# Patient Record
Sex: FEMALE | Race: WHITE | NOT HISPANIC OR LATINO
[De-identification: names, ages, dates, MRNs, and addresses within clinical notes are randomized per-mention and may not be internally consistent; named-entity substitution may affect disease eponyms.]

---

## 2022-06-22 ENCOUNTER — RESULT REVIEW (OUTPATIENT)
Age: 32
End: 2022-06-22

## 2022-08-09 PROBLEM — Z00.00 ENCOUNTER FOR PREVENTIVE HEALTH EXAMINATION: Status: ACTIVE | Noted: 2022-08-09

## 2022-09-01 ENCOUNTER — ASOB RESULT (OUTPATIENT)
Age: 32
End: 2022-09-01

## 2022-09-01 ENCOUNTER — APPOINTMENT (OUTPATIENT)
Dept: ANTEPARTUM | Facility: CLINIC | Age: 32
End: 2022-09-01

## 2022-09-01 PROCEDURE — 76811 OB US DETAILED SNGL FETUS: CPT

## 2023-01-19 ENCOUNTER — TRANSCRIPTION ENCOUNTER (OUTPATIENT)
Age: 33
End: 2023-01-19

## 2023-01-20 ENCOUNTER — TRANSCRIPTION ENCOUNTER (OUTPATIENT)
Age: 33
End: 2023-01-20

## 2023-01-20 ENCOUNTER — INPATIENT (INPATIENT)
Facility: HOSPITAL | Age: 33
LOS: 2 days | Discharge: HOME | End: 2023-01-23
Attending: OBSTETRICS & GYNECOLOGY | Admitting: OBSTETRICS & GYNECOLOGY
Payer: COMMERCIAL

## 2023-01-20 VITALS
RESPIRATION RATE: 18 BRPM | HEART RATE: 77 BPM | SYSTOLIC BLOOD PRESSURE: 118 MMHG | TEMPERATURE: 99 F | OXYGEN SATURATION: 98 % | DIASTOLIC BLOOD PRESSURE: 67 MMHG

## 2023-01-20 DIAGNOSIS — O26.899 OTHER SPECIFIED PREGNANCY RELATED CONDITIONS, UNSPECIFIED TRIMESTER: ICD-10-CM

## 2023-01-20 DIAGNOSIS — Z34.80 ENCOUNTER FOR SUPERVISION OF OTHER NORMAL PREGNANCY, UNSPECIFIED TRIMESTER: ICD-10-CM

## 2023-01-20 LAB
BASOPHILS # BLD AUTO: 0.06 K/UL — SIGNIFICANT CHANGE UP (ref 0–0.2)
BASOPHILS NFR BLD AUTO: 0.5 % — SIGNIFICANT CHANGE UP (ref 0–2)
BLD GP AB SCN SERPL QL: NEGATIVE — SIGNIFICANT CHANGE UP
COVID-19 SPIKE DOMAIN AB INTERP: POSITIVE
COVID-19 SPIKE DOMAIN ANTIBODY RESULT: >250 U/ML — HIGH
EOSINOPHIL # BLD AUTO: 0.09 K/UL — SIGNIFICANT CHANGE UP (ref 0–0.5)
EOSINOPHIL NFR BLD AUTO: 0.7 % — SIGNIFICANT CHANGE UP (ref 0–6)
HCT VFR BLD CALC: 35.1 % — SIGNIFICANT CHANGE UP (ref 34.5–45)
HGB BLD-MCNC: 11.8 G/DL — SIGNIFICANT CHANGE UP (ref 11.5–15.5)
IMM GRANULOCYTES NFR BLD AUTO: 1.1 % — HIGH (ref 0–0.9)
LYMPHOCYTES # BLD AUTO: 18.7 % — SIGNIFICANT CHANGE UP (ref 13–44)
LYMPHOCYTES # BLD AUTO: 2.32 K/UL — SIGNIFICANT CHANGE UP (ref 1–3.3)
MCHC RBC-ENTMCNC: 33.6 GM/DL — SIGNIFICANT CHANGE UP (ref 32–36)
MCHC RBC-ENTMCNC: 33.8 PG — SIGNIFICANT CHANGE UP (ref 27–34)
MCV RBC AUTO: 100.6 FL — HIGH (ref 80–100)
MONOCYTES # BLD AUTO: 0.65 K/UL — SIGNIFICANT CHANGE UP (ref 0–0.9)
MONOCYTES NFR BLD AUTO: 5.2 % — SIGNIFICANT CHANGE UP (ref 2–14)
NEUTROPHILS # BLD AUTO: 9.17 K/UL — HIGH (ref 1.8–7.4)
NEUTROPHILS NFR BLD AUTO: 73.8 % — SIGNIFICANT CHANGE UP (ref 43–77)
NRBC # BLD: 0 /100 WBCS — SIGNIFICANT CHANGE UP (ref 0–0)
PLATELET # BLD AUTO: 280 K/UL — SIGNIFICANT CHANGE UP (ref 150–400)
RBC # BLD: 3.49 M/UL — LOW (ref 3.8–5.2)
RBC # FLD: 12 % — SIGNIFICANT CHANGE UP (ref 10.3–14.5)
RH IG SCN BLD-IMP: POSITIVE — SIGNIFICANT CHANGE UP
RH IG SCN BLD-IMP: POSITIVE — SIGNIFICANT CHANGE UP
SARS-COV-2 IGG+IGM SERPL QL IA: >250 U/ML — HIGH
SARS-COV-2 IGG+IGM SERPL QL IA: POSITIVE
SARS-COV-2 RNA SPEC QL NAA+PROBE: DETECTED
T PALLIDUM AB TITR SER: NEGATIVE — SIGNIFICANT CHANGE UP
WBC # BLD: 12.43 K/UL — HIGH (ref 3.8–10.5)
WBC # FLD AUTO: 12.43 K/UL — HIGH (ref 3.8–10.5)

## 2023-01-20 PROCEDURE — 88307 TISSUE EXAM BY PATHOLOGIST: CPT | Mod: 26

## 2023-01-20 DEVICE — ARISTA 3GR: Type: IMPLANTABLE DEVICE | Status: FUNCTIONAL

## 2023-01-20 RX ORDER — OXYTOCIN 10 UNIT/ML
333.33 VIAL (ML) INJECTION
Qty: 20 | Refills: 0 | Status: DISCONTINUED | OUTPATIENT
Start: 2023-01-20 | End: 2023-01-23

## 2023-01-20 RX ORDER — OXYCODONE HYDROCHLORIDE 5 MG/1
5 TABLET ORAL
Refills: 0 | Status: DISCONTINUED | OUTPATIENT
Start: 2023-01-20 | End: 2023-01-21

## 2023-01-20 RX ORDER — SODIUM CHLORIDE 9 MG/ML
1000 INJECTION, SOLUTION INTRAVENOUS
Refills: 0 | Status: DISCONTINUED | OUTPATIENT
Start: 2023-01-20 | End: 2023-01-21

## 2023-01-20 RX ORDER — CITRIC ACID/SODIUM CITRATE 300-500 MG
15 SOLUTION, ORAL ORAL EVERY 6 HOURS
Refills: 0 | Status: DISCONTINUED | OUTPATIENT
Start: 2023-01-20 | End: 2023-01-21

## 2023-01-20 RX ORDER — LANOLIN
1 OINTMENT (GRAM) TOPICAL EVERY 6 HOURS
Refills: 0 | Status: DISCONTINUED | OUTPATIENT
Start: 2023-01-20 | End: 2023-01-23

## 2023-01-20 RX ORDER — ONDANSETRON 8 MG/1
4 TABLET, FILM COATED ORAL EVERY 6 HOURS
Refills: 0 | Status: DISCONTINUED | OUTPATIENT
Start: 2023-01-20 | End: 2023-01-23

## 2023-01-20 RX ORDER — NALBUPHINE HYDROCHLORIDE 10 MG/ML
2.5 INJECTION, SOLUTION INTRAMUSCULAR; INTRAVENOUS; SUBCUTANEOUS EVERY 6 HOURS
Refills: 0 | Status: DISCONTINUED | OUTPATIENT
Start: 2023-01-20 | End: 2023-01-23

## 2023-01-20 RX ORDER — KETOROLAC TROMETHAMINE 30 MG/ML
30 SYRINGE (ML) INJECTION EVERY 6 HOURS
Refills: 0 | Status: DISCONTINUED | OUTPATIENT
Start: 2023-01-20 | End: 2023-01-22

## 2023-01-20 RX ORDER — OXYCODONE HYDROCHLORIDE 5 MG/1
5 TABLET ORAL
Refills: 0 | Status: DISCONTINUED | OUTPATIENT
Start: 2023-01-20 | End: 2023-01-23

## 2023-01-20 RX ORDER — NALOXONE HYDROCHLORIDE 4 MG/.1ML
0.1 SPRAY NASAL
Refills: 0 | Status: DISCONTINUED | OUTPATIENT
Start: 2023-01-20 | End: 2023-01-23

## 2023-01-20 RX ORDER — SIMETHICONE 80 MG/1
80 TABLET, CHEWABLE ORAL EVERY 4 HOURS
Refills: 0 | Status: DISCONTINUED | OUTPATIENT
Start: 2023-01-20 | End: 2023-01-23

## 2023-01-20 RX ORDER — SODIUM CHLORIDE 9 MG/ML
1000 INJECTION, SOLUTION INTRAVENOUS
Refills: 0 | Status: DISCONTINUED | OUTPATIENT
Start: 2023-01-20 | End: 2023-01-23

## 2023-01-20 RX ORDER — MORPHINE SULFATE 50 MG/1
2 CAPSULE, EXTENDED RELEASE ORAL ONCE
Refills: 0 | Status: DISCONTINUED | OUTPATIENT
Start: 2023-01-20 | End: 2023-01-22

## 2023-01-20 RX ORDER — OXYCODONE HYDROCHLORIDE 5 MG/1
5 TABLET ORAL ONCE
Refills: 0 | Status: DISCONTINUED | OUTPATIENT
Start: 2023-01-20 | End: 2023-01-23

## 2023-01-20 RX ORDER — MAGNESIUM HYDROXIDE 400 MG/1
30 TABLET, CHEWABLE ORAL
Refills: 0 | Status: DISCONTINUED | OUTPATIENT
Start: 2023-01-20 | End: 2023-01-23

## 2023-01-20 RX ORDER — TETANUS TOXOID, REDUCED DIPHTHERIA TOXOID AND ACELLULAR PERTUSSIS VACCINE, ADSORBED 5; 2.5; 8; 8; 2.5 [IU]/.5ML; [IU]/.5ML; UG/.5ML; UG/.5ML; UG/.5ML
0.5 SUSPENSION INTRAMUSCULAR ONCE
Refills: 0 | Status: DISCONTINUED | OUTPATIENT
Start: 2023-01-20 | End: 2023-01-23

## 2023-01-20 RX ORDER — HEPARIN SODIUM 5000 [USP'U]/ML
5000 INJECTION INTRAVENOUS; SUBCUTANEOUS EVERY 12 HOURS
Refills: 0 | Status: DISCONTINUED | OUTPATIENT
Start: 2023-01-20 | End: 2023-01-23

## 2023-01-20 RX ORDER — CHLORHEXIDINE GLUCONATE 213 G/1000ML
1 SOLUTION TOPICAL ONCE
Refills: 0 | Status: DISCONTINUED | OUTPATIENT
Start: 2023-01-20 | End: 2023-01-21

## 2023-01-20 RX ORDER — ACETAMINOPHEN 500 MG
975 TABLET ORAL
Refills: 0 | Status: DISCONTINUED | OUTPATIENT
Start: 2023-01-20 | End: 2023-01-23

## 2023-01-20 RX ORDER — DEXAMETHASONE 0.5 MG/5ML
4 ELIXIR ORAL EVERY 6 HOURS
Refills: 0 | Status: DISCONTINUED | OUTPATIENT
Start: 2023-01-20 | End: 2023-01-23

## 2023-01-20 RX ORDER — SODIUM CHLORIDE 9 MG/ML
1000 INJECTION, SOLUTION INTRAVENOUS ONCE
Refills: 0 | Status: COMPLETED | OUTPATIENT
Start: 2023-01-20 | End: 2023-01-20

## 2023-01-20 RX ORDER — IBUPROFEN 200 MG
600 TABLET ORAL EVERY 6 HOURS
Refills: 0 | Status: COMPLETED | OUTPATIENT
Start: 2023-01-20 | End: 2023-12-19

## 2023-01-20 RX ORDER — DIPHENHYDRAMINE HCL 50 MG
25 CAPSULE ORAL EVERY 6 HOURS
Refills: 0 | Status: DISCONTINUED | OUTPATIENT
Start: 2023-01-20 | End: 2023-01-23

## 2023-01-20 RX ADMIN — SODIUM CHLORIDE 125 MILLILITER(S): 9 INJECTION, SOLUTION INTRAVENOUS at 12:09

## 2023-01-20 RX ADMIN — Medication 15 MILLILITER(S): at 16:22

## 2023-01-20 RX ADMIN — Medication 0.25 MILLIGRAM(S): at 17:01

## 2023-01-20 RX ADMIN — Medication 0.25 MILLIGRAM(S): at 16:59

## 2023-01-20 RX ADMIN — SODIUM CHLORIDE 1000 MILLILITER(S): 9 INJECTION, SOLUTION INTRAVENOUS at 19:00

## 2023-01-20 NOTE — OB PROVIDER LABOR PROGRESS NOTE - NS_OBIHIFHRDETAILS_OBGYN_ALL_OB_FT
140/ mod sly/ (+) accel/ (+) spon decel x 1 while on back while placing cook balloon
 with minimal variability for about 40 minutes followed by a 3 minute deceleration  with recovery to 160

## 2023-01-20 NOTE — OB PROVIDER TRIAGE NOTE - NSOBPROVIDERNOTE_OBGYN_ALL_OB_FT
A/P: 32y   @ 40 1/7wks presents from MDs office w/ BPP 2/10 (2pts for fluid).  NST reactive  BPP to be done by sono tech  D/W Dr. Yoon

## 2023-01-20 NOTE — OB PROVIDER H&P - NS ATTEND AMEND GEN_ALL_CORE FT
PT SENT FROM OFFICE WHERE ROUTINE BPP FOR POSTDATES=2/8 [MAINOR=7]. PT ADMITS TO FM. HAVING BHC. BLOODY SHOW.   ON L&D NST REACTIVE, NEG CST. BPP 6/10 NO FB, FT DIFFICULT TO ASSESS.   IN VIEW OF 40W1D REC IOL. I BELIEVE PT IS IN EARLY LABOR. IOL PROCESS DISCUSSED. PT AGREES. WILL START BC, THEN CF    KEVEN PATEL

## 2023-01-20 NOTE — OB RN TRIAGE NOTE - NS_MEANSOFARRIVAL_OBGYN_ALL_OB
Online Visit    Date/Time: 1/11/2018 1:56:04 PM  To: NOVA GARCIA  From: MARK GUZMAN  Subject:    Your labs are normal. Have a great day!  Dr. Guzman      Verified Results  Prenatal Panel H (SYPIGG, CBCNO, DIFA, RUBEL, HBAG, ABRHSN, HIVSCR) [id number] 04:10PM MARK GUZMAN     Test Name Result Flag Reference   Hepatitis B Surface Ag NEGATIVE  NEGATIVE   RUBELLA ANTIBODY, .0 UNITS/ML  >9.9   <5.0 Units/mL = Negative for IgG antibodies (Non Immune)  5.0 to 9.9 Units/mL = Equivocal (Non Immune)  >9.9 Units/mL = Immune     Result does not represent an antibody titer.   WHITE BLOOD COUNT 8.5 K/mcL  4.2-11.0   RED CELL COUNT 4.27 mil/mcL  4.00-5.20   HEMOGLOBIN 13.9 g/dl  12.0-15.5   HEMATOCRIT 40.4 %  36.0-46.5   MEAN CORPUSCULAR VOLUME 94.6 fL  78.0-100.0   MEAN CORPUSCULAR HEMOGLOBIN 32.6 pg  26.0-34.0   MEAN CORPUSCULAR HGB CONC 34.4 g/dl  32.0-36.5   RDW-CV 12.6 %  11.0-15.0   PLATELET COUNT 195 K/mcL  140-450   VIRGIL% 69 %     LYM% 19 %     MON% 10 %     EOS% 2 %     BASO% 0 %     VIRGIL ABS 5.8 K/mcL  1.8-7.7   LYM ABS 1.6 K/mcL  1.0-4.8   MON ABS 0.9 K/mcL  0.3-0.9   EOS ABS 0.2 K/mcL  0.1-0.5   BASO ABS 0.0 K/mcL  0.0-0.3   TREPONEMA PALLIDUM IGG AB NONREACTIVE  NONREACTIVE   See Directory of Services for interpretation of syphilis testing algorithm.   DIFF TYPE      AUTOMATED DIFFERENTIAL   ABO/RH(D) A NEGATIVE     ANTIBODY SCREEN NEGATIVE     HIV ANTIGEN/ANTIBODY SCREEN NONREACTIVE  NONREACTIVE       
Ambulatory

## 2023-01-20 NOTE — OB PROVIDER LABOR PROGRESS NOTE - ASSESSMENT
32y   @ 40 1/7wks for IOL 2/2 BPP on L&D 6/10 (no breathing, no movement)  C/W PO cytotec for IOL  Dr. Yoon at bedside  
A/P: Category 2 tracing remote from delivery with a labor induction for a 6/10 BPP(sent from office with  BPP)  -Labor induction started with cervical carrasco and po cytotec  -Pt had 2 doses po cytotec and has decels that have already required 2 doses of terbutaline  I reviewed that our options are to perform AROM and place IUPC and see if we can start pitocin in 30 minutes however in light of the induction indication  and the spontaneous prolonged decels, the fetus will not likely tolerate pitocin and we are remote from delivery. We reviewed currently this is a category 2 tracing  but as we are remote from delivery and have had many decels today and a 6/10 BPP I recommend  delivery. Pt and partner are agreeable.   -Anesthesia notified  Camilo Mace MD

## 2023-01-20 NOTE — OB RN PATIENT PROFILE - FALL HARM RISK - HARM RISK INTERVENTIONS

## 2023-01-20 NOTE — OB PROVIDER H&P - HISTORY OF PRESENT ILLNESS
32y   @ 40 1/7wks for IOL 2/2 BPP on L&D 6/10 (no breathing, no tone). Pt originally presented from MDs office w/ BPP 2/10 (2pts for fluid). (+) fetal movement (+) cramping. Denies CTX, leakage of fluid or vaginal bleeding. PNC uncomplicated.    EFW 3850  GBS Neg  Ax: PCN- rash as child  MedHx: denies  Meds: PNV, Fe  ObHx: PNC uncomplicated to date  GynHx: (+) small fibroid. denies abnl paps/cysts/endometriosis/ HPV  SurgHx: denies  FamHx: Denies  Pt accepts blood products   32y   @ 40 1/7wks for IOL 2/2 BPP on L&D 6/10 (no breathing, no tone). Pt originally presented from MDs office w/ BPP  (2pts for fluid). (+) fetal movement (+) cramping. Denies CTX, leakage of fluid or vaginal bleeding. PNC uncomplicated.    EFW 3850, 6-12 on 22.  GBS Neg  Ax: PCN- rash as child  MedHx: denies  Meds: PNV, Fe  ObHx: PNC uncomplicated to date  GynHx: (+) small fibroid. denies abnl paps/cysts/endometriosis/ HPV  SurgHx: denies  FamHx: Denies  Pt accepts blood products

## 2023-01-20 NOTE — OB PROVIDER TRIAGE NOTE - NSHPPHYSICALEXAM_GEN_ALL_CORE
PE:  T(C): 37 (01-20-23 @ 10:09), Max: 37.0 (01-20-23 @ 10:09)  HR: 76 (01-20-23 @ 10:52) (76 - 91)  BP: 118/67 (01-20-23 @ 10:09) (118/67 - 118/67)  RR: 18 (01-20-23 @ 10:09) (18 - 18)  SpO2: 97% (01-20-23 @ 10:52) (97% - 98%)  CV: RRR  Lungs: CTA B/L  Abd: soft/NT, gravid  EFM: 135/ mod sly/ (+) accel/ (-) decel  [place]: irreg ctx  VE: 0.5/50/-3  BSUS BPP to be done by sono tech

## 2023-01-20 NOTE — OB PROVIDER TRIAGE NOTE - HISTORY OF PRESENT ILLNESS
32y   @ 40 1/7wks presents from MDs office w/ BPP 2/10. (+) fetal movement (+) cramping. Denies CTX, leakage of fluid or vaginal bleeding. PNC uncomplicated.    EFW 3500  GBS Neg  Ax: PCN- rash as child  MedHx: denies  Meds: PNV, Fe  ObHx: PNC uncomplicated to date  GynHx: (+) small fibroid. denies abnl paps/cysts/endometriosis/ HPV  SurgHx: denies  FamHx: Denies  Pt accepts blood products   32y   @ 40 1/7wks presents from MDs office w/ BPP 2/10 (2pts for fluid). (+) fetal movement (+) cramping. Denies CTX, leakage of fluid or vaginal bleeding. PNC uncomplicated.    EFW 3850  GBS Neg  Ax: PCN- rash as child  MedHx: denies  Meds: PNV, Fe  ObHx: PNC uncomplicated to date  GynHx: (+) small fibroid. denies abnl paps/cysts/endometriosis/ HPV  SurgHx: denies  FamHx: Denies  Pt accepts blood products

## 2023-01-20 NOTE — OB RN INTRAOPERATIVE NOTE - NSSELHIDDEN_OBGYN_ALL_OB_FT
[NS_DeliveryAttending1_OBGYN_ALL_OB_FT:[id number]],[NS_DeliveryAssist1_OBGYN_ALL_OB_FT:[id number]=],[NS_DeliveryRN_OBGYN_ALL_OB_FT:[id number]]

## 2023-01-20 NOTE — OB PROVIDER H&P - ASSESSMENT
32y   @ 40 1/7wks for IOL 2/2 BPP on L&D 6/10 (no breathing, no tone)  Admit to L&D  EFM/[place]  Routine labs  IVFluids  NPO/Biictra  Buccal cyto for IOL  Anesthesia c/s  D/W Dr. Yoon 32y   @ 40 1/7wks for IOL 2/2 BPP on L&D 6/10 (no breathing, no movement)  Admit to L&D  EFM/[place]  Routine labs  IVFluids  NPO/Biictra  Buccal cyto for IOL  Anesthesia c/s  D/W Dr. Yoon

## 2023-01-20 NOTE — OB RN DELIVERY SUMMARY - NSSELHIDDEN_OBGYN_ALL_OB_FT
[NS_DeliveryAttending1_OBGYN_ALL_OB_FT:[id number]=],[NS_DeliveryAssist1_OBGYN_ALL_OB_FT:[email or web address]=],[NS_DeliveryRN_OBGYN_ALL_OB_FT:[id number]]

## 2023-01-20 NOTE — OB PROVIDER LABOR PROGRESS NOTE - NS_SUBJECTIVE/OBJECTIVE_OBGYN_ALL_OB_FT
Cook balloon placed w/o incident, 60cc each balloon
Pt seen for another decel of 3 minutes. Pt comfortable

## 2023-01-20 NOTE — OB PROVIDER H&P - NSHPPHYSICALEXAM_GEN_ALL_CORE
Vital Signs Last 24 Hrs  T(C): 37 (20 Jan 2023 10:09), Max: 37.0 (20 Jan 2023 10:09)  T(F): 98.6 (20 Jan 2023 10:09), Max: 98.6 (20 Jan 2023 10:09)  HR: 74 (20 Jan 2023 11:25) (73 - 91)  BP: 118/67 (20 Jan 2023 10:09) (118/67 - 118/67)  BP(mean): --  RR: 18 (20 Jan 2023 10:09) (18 - 18)  SpO2: 98% (20 Jan 2023 11:25) (96% - 98%)    Parameters below as of 20 Jan 2023 10:09  Patient On (Oxygen Delivery Method): room air    Lungs: CTA B/L  Abd: soft/NT, gravid  EFM: 135/ mod sly/ (+) accel/ (-) decel  [place]: irreg ctx  VE: 0.5/60/-3  BPP by sono tech vtx, 6/10

## 2023-01-20 NOTE — OB RN TRIAGE NOTE - FALL HARM RISK - HARM RISK INTERVENTIONS

## 2023-01-20 NOTE — OB RN DELIVERY SUMMARY - NS_SEPSISRSKCALC_OBGYN_ALL_OB_FT
EOS calculated successfully. EOS Risk Factor: 0.5/1000 live births ([facility] national incidence); GA=40w1d; Temp=98.6; ROM=0.017; GBS='Negative'; Antibiotics='No antibiotics or any antibiotics < 2 hrs prior to birth'

## 2023-01-21 LAB
ALBUMIN SERPL ELPH-MCNC: 2.7 G/DL — LOW (ref 3.3–5)
ALP SERPL-CCNC: 93 U/L — SIGNIFICANT CHANGE UP (ref 40–120)
ALT FLD-CCNC: 7 U/L — LOW (ref 10–45)
ANION GAP SERPL CALC-SCNC: 10 MMOL/L — SIGNIFICANT CHANGE UP (ref 5–17)
APTT BLD: 25.3 SEC — LOW (ref 27.5–35.5)
AST SERPL-CCNC: 15 U/L — SIGNIFICANT CHANGE UP (ref 10–40)
BASOPHILS # BLD AUTO: 0.03 K/UL — SIGNIFICANT CHANGE UP (ref 0–0.2)
BASOPHILS # BLD AUTO: 0.03 K/UL — SIGNIFICANT CHANGE UP (ref 0–0.2)
BASOPHILS NFR BLD AUTO: 0.2 % — SIGNIFICANT CHANGE UP (ref 0–2)
BASOPHILS NFR BLD AUTO: 0.2 % — SIGNIFICANT CHANGE UP (ref 0–2)
BILIRUB SERPL-MCNC: 0.2 MG/DL — SIGNIFICANT CHANGE UP (ref 0.2–1.2)
BUN SERPL-MCNC: 6 MG/DL — LOW (ref 7–23)
CALCIUM SERPL-MCNC: 8.5 MG/DL — SIGNIFICANT CHANGE UP (ref 8.4–10.5)
CHLORIDE SERPL-SCNC: 104 MMOL/L — SIGNIFICANT CHANGE UP (ref 96–108)
CO2 SERPL-SCNC: 22 MMOL/L — SIGNIFICANT CHANGE UP (ref 22–31)
CREAT SERPL-MCNC: 0.48 MG/DL — LOW (ref 0.5–1.3)
EGFR: 130 ML/MIN/1.73M2 — SIGNIFICANT CHANGE UP
EOSINOPHIL # BLD AUTO: 0.02 K/UL — SIGNIFICANT CHANGE UP (ref 0–0.5)
EOSINOPHIL # BLD AUTO: 0.1 K/UL — SIGNIFICANT CHANGE UP (ref 0–0.5)
EOSINOPHIL NFR BLD AUTO: 0.1 % — SIGNIFICANT CHANGE UP (ref 0–6)
EOSINOPHIL NFR BLD AUTO: 0.6 % — SIGNIFICANT CHANGE UP (ref 0–6)
FIBRINOGEN PPP-MCNC: 355 MG/DL — SIGNIFICANT CHANGE UP (ref 200–445)
GLUCOSE SERPL-MCNC: 92 MG/DL — SIGNIFICANT CHANGE UP (ref 70–99)
HCT VFR BLD CALC: 19.8 % — CRITICAL LOW (ref 34.5–45)
HCT VFR BLD CALC: 23.7 % — LOW (ref 34.5–45)
HCT VFR BLD CALC: 23.8 % — LOW (ref 34.5–45)
HCT VFR BLD CALC: 24.6 % — LOW (ref 34.5–45)
HGB BLD-MCNC: 6.6 G/DL — CRITICAL LOW (ref 11.5–15.5)
HGB BLD-MCNC: 7.8 G/DL — LOW (ref 11.5–15.5)
HGB BLD-MCNC: 7.8 G/DL — LOW (ref 11.5–15.5)
HGB BLD-MCNC: 8 G/DL — LOW (ref 11.5–15.5)
IMM GRANULOCYTES NFR BLD AUTO: 0.7 % — SIGNIFICANT CHANGE UP (ref 0–0.9)
IMM GRANULOCYTES NFR BLD AUTO: 0.8 % — SIGNIFICANT CHANGE UP (ref 0–0.9)
INR BLD: 1 RATIO — SIGNIFICANT CHANGE UP (ref 0.88–1.16)
LYMPHOCYTES # BLD AUTO: 1.66 K/UL — SIGNIFICANT CHANGE UP (ref 1–3.3)
LYMPHOCYTES # BLD AUTO: 10.6 % — LOW (ref 13–44)
LYMPHOCYTES # BLD AUTO: 12.9 % — LOW (ref 13–44)
LYMPHOCYTES # BLD AUTO: 2.19 K/UL — SIGNIFICANT CHANGE UP (ref 1–3.3)
MCHC RBC-ENTMCNC: 32.5 GM/DL — SIGNIFICANT CHANGE UP (ref 32–36)
MCHC RBC-ENTMCNC: 32.5 PG — SIGNIFICANT CHANGE UP (ref 27–34)
MCHC RBC-ENTMCNC: 32.6 PG — SIGNIFICANT CHANGE UP (ref 27–34)
MCHC RBC-ENTMCNC: 32.8 GM/DL — SIGNIFICANT CHANGE UP (ref 32–36)
MCHC RBC-ENTMCNC: 32.9 GM/DL — SIGNIFICANT CHANGE UP (ref 32–36)
MCHC RBC-ENTMCNC: 33.3 GM/DL — SIGNIFICANT CHANGE UP (ref 32–36)
MCHC RBC-ENTMCNC: 33.5 PG — SIGNIFICANT CHANGE UP (ref 27–34)
MCHC RBC-ENTMCNC: 33.8 PG — SIGNIFICANT CHANGE UP (ref 27–34)
MCV RBC AUTO: 101.5 FL — HIGH (ref 80–100)
MCV RBC AUTO: 102.9 FL — HIGH (ref 80–100)
MCV RBC AUTO: 98.8 FL — SIGNIFICANT CHANGE UP (ref 80–100)
MCV RBC AUTO: 99.6 FL — SIGNIFICANT CHANGE UP (ref 80–100)
MONOCYTES # BLD AUTO: 0.74 K/UL — SIGNIFICANT CHANGE UP (ref 0–0.9)
MONOCYTES # BLD AUTO: 0.89 K/UL — SIGNIFICANT CHANGE UP (ref 0–0.9)
MONOCYTES NFR BLD AUTO: 4.4 % — SIGNIFICANT CHANGE UP (ref 2–14)
MONOCYTES NFR BLD AUTO: 5.7 % — SIGNIFICANT CHANGE UP (ref 2–14)
NEUTROPHILS # BLD AUTO: 12.88 K/UL — HIGH (ref 1.8–7.4)
NEUTROPHILS # BLD AUTO: 13.8 K/UL — HIGH (ref 1.8–7.4)
NEUTROPHILS NFR BLD AUTO: 81.6 % — HIGH (ref 43–77)
NEUTROPHILS NFR BLD AUTO: 82.2 % — HIGH (ref 43–77)
NRBC # BLD: 0 /100 WBCS — SIGNIFICANT CHANGE UP (ref 0–0)
PLATELET # BLD AUTO: 171 K/UL — SIGNIFICANT CHANGE UP (ref 150–400)
PLATELET # BLD AUTO: 173 K/UL — SIGNIFICANT CHANGE UP (ref 150–400)
PLATELET # BLD AUTO: 190 K/UL — SIGNIFICANT CHANGE UP (ref 150–400)
PLATELET # BLD AUTO: 268 K/UL — SIGNIFICANT CHANGE UP (ref 150–400)
POTASSIUM SERPL-MCNC: 4.6 MMOL/L — SIGNIFICANT CHANGE UP (ref 3.5–5.3)
POTASSIUM SERPL-SCNC: 4.6 MMOL/L — SIGNIFICANT CHANGE UP (ref 3.5–5.3)
PROT SERPL-MCNC: 4.5 G/DL — LOW (ref 6–8.3)
PROTHROM AB SERPL-ACNC: 11.5 SEC — SIGNIFICANT CHANGE UP (ref 10.5–13.4)
RBC # BLD: 1.95 M/UL — LOW (ref 3.8–5.2)
RBC # BLD: 2.39 M/UL — LOW (ref 3.8–5.2)
RBC # BLD: 2.39 M/UL — LOW (ref 3.8–5.2)
RBC # BLD: 2.4 M/UL — LOW (ref 3.8–5.2)
RBC # FLD: 12 % — SIGNIFICANT CHANGE UP (ref 10.3–14.5)
RBC # FLD: 12.2 % — SIGNIFICANT CHANGE UP (ref 10.3–14.5)
RBC # FLD: 12.9 % — SIGNIFICANT CHANGE UP (ref 10.3–14.5)
RBC # FLD: 13.8 % — SIGNIFICANT CHANGE UP (ref 10.3–14.5)
SODIUM SERPL-SCNC: 136 MMOL/L — SIGNIFICANT CHANGE UP (ref 135–145)
WBC # BLD: 14.71 K/UL — HIGH (ref 3.8–10.5)
WBC # BLD: 14.78 K/UL — HIGH (ref 3.8–10.5)
WBC # BLD: 15.67 K/UL — HIGH (ref 3.8–10.5)
WBC # BLD: 16.92 K/UL — HIGH (ref 3.8–10.5)
WBC # FLD AUTO: 14.71 K/UL — HIGH (ref 3.8–10.5)
WBC # FLD AUTO: 14.78 K/UL — HIGH (ref 3.8–10.5)
WBC # FLD AUTO: 15.67 K/UL — HIGH (ref 3.8–10.5)
WBC # FLD AUTO: 16.92 K/UL — HIGH (ref 3.8–10.5)

## 2023-01-21 PROCEDURE — 59514 CESAREAN DELIVERY ONLY: CPT | Mod: AS,U9

## 2023-01-21 RX ORDER — CEFAZOLIN SODIUM 1 G
2000 VIAL (EA) INJECTION ONCE
Refills: 0 | Status: COMPLETED | OUTPATIENT
Start: 2023-01-21 | End: 2023-01-21

## 2023-01-21 RX ORDER — SODIUM CHLORIDE 9 MG/ML
1000 INJECTION, SOLUTION INTRAVENOUS ONCE
Refills: 0 | Status: DISCONTINUED | OUTPATIENT
Start: 2023-01-21 | End: 2023-01-23

## 2023-01-21 RX ADMIN — Medication 30 MILLIGRAM(S): at 04:13

## 2023-01-21 RX ADMIN — OXYCODONE HYDROCHLORIDE 5 MILLIGRAM(S): 5 TABLET ORAL at 20:52

## 2023-01-21 RX ADMIN — Medication 30 MILLIGRAM(S): at 20:21

## 2023-01-21 RX ADMIN — HEPARIN SODIUM 5000 UNIT(S): 5000 INJECTION INTRAVENOUS; SUBCUTANEOUS at 17:01

## 2023-01-21 RX ADMIN — OXYCODONE HYDROCHLORIDE 5 MILLIGRAM(S): 5 TABLET ORAL at 20:22

## 2023-01-21 RX ADMIN — Medication 975 MILLIGRAM(S): at 15:30

## 2023-01-21 RX ADMIN — Medication 100 MILLIGRAM(S): at 05:55

## 2023-01-21 RX ADMIN — Medication 975 MILLIGRAM(S): at 23:47

## 2023-01-21 RX ADMIN — Medication 975 MILLIGRAM(S): at 15:06

## 2023-01-21 RX ADMIN — Medication 30 MILLIGRAM(S): at 20:51

## 2023-01-21 RX ADMIN — Medication 30 MILLIGRAM(S): at 13:48

## 2023-01-21 RX ADMIN — Medication 975 MILLIGRAM(S): at 08:36

## 2023-01-21 RX ADMIN — Medication 975 MILLIGRAM(S): at 01:33

## 2023-01-21 RX ADMIN — Medication 975 MILLIGRAM(S): at 23:17

## 2023-01-21 NOTE — DISCHARGE NOTE OB - PATIENT PORTAL LINK FT
You can access the FollowMyHealth Patient Portal offered by [facility] by registering at the following website: [email or web address]. By joining [facility]’s FollowMyHealth portal, you will also be able to view your health information using other applications (apps) compatible with our system.

## 2023-01-21 NOTE — DISCHARGE NOTE OB - CARE PLAN
Assessment and plan of treatment:	POD  S/P Primary  delivery for nonreassuring fetal heart tracing   1

## 2023-01-21 NOTE — DISCHARGE NOTE OB - CARE PROVIDER_API CALL
Camilo Mace  OBSTETRICS AND GYNECOLOGY  [address], [address]  [place] [address]  Phone: [phone]  Fax: [phone]  Follow Up Time:

## 2023-01-21 NOTE — OB NEONATOLOGY/PEDIATRICIAN DELIVERY SUMMARY - NSPEDSNEONOTESA_OBGYN_ALL_OB_FT
Peds called to OR for NRFHR, IOL for low BPP 4/8 to 40.1 wk male born via CS to a 33 y/o  mother.  Maternal history of +HPV in  (resolved). No significant prenatal history. Maternal labs include Blood Type O+ , HIV - , RPR NR , Rubella I , Hep B - , GBS - 22, COVID +22 (home test), COVID PCR +23 (as per infection control no isolation precautions needed), AROM at delivery with clear fluids (ROM hours: 0). Baby emerged vigorous, crying, was warmed, dried suctioned and stimulated with APGARS of 9/9. Mom plans to initiate breastfeeding, consents Hep B vaccine and consents circ.  Highest maternal temp: 37.0 C. EOS 0.04.    Physical Exam:  Gen: no acute distress, +grimace  HEENT:  + molding, anterior fontanel open soft and flat, nondysmorphic facies, no cleft lip/palate, ears normal set, +left ear folded, no ear pits or tags, nares clinically patent  Resp: Normal respiratory effort without grunting or retractions, good air entry b/l, clear to auscultation bilaterally  Cardio: Present S1/S2, regular rate and rhythm, no murmurs  Abd: soft, non tender, non distended, umbilical cord with 3 vessels  Neuro: +palmar and plantar grasp, +suck, +alonso, normal tone  Extremities: negative Thornton and Ortolani maneuvers, moving all extremities, no clavicular crepitus or stepoff  Skin: pink, warm  Genitals: Normal male anatomy, +testicles palpable in scrotum b/l but firm to the touch (Rt>Lt), + b/l hydrocele, Seun 1, anus patent

## 2023-01-21 NOTE — DISCHARGE NOTE OB - HOSPITAL COURSE
Pt was admitted for a 2/8 BPP and repeat was 6/10. Induction was started and the fetus had recurrent decelerations unable to tolerate the induction. She had a rpimary cesaren delivery and required 1 dose methergine and pitocin for atony as well as a lower uterine segment extension. She is anemic. Pt was admitted for a 2/8 BPP and repeat was 6/10. Induction was started and the fetus had recurrent decelerations unable to tolerate the induction. She had a rpimary cesaren delivery and required 1 dose methergine and pitocin for atony as well as a lower uterine segment extension. She is anemic. s/p 2 u prbc. D/C pod #3

## 2023-01-21 NOTE — CHART NOTE - NSCHARTNOTEFT_GEN_A_CORE
Pt. with Cook Balloon noted to have fetal deceleration with cristina 60 bpm with duration 7 min. with BP 99/58 s/p rec'd epidural ~ 15 min. ago.  VE:  CB in place - 1/90/-3/mid/mod. tone  Pt. was noted to have tetanic ctx. which resolved after 2 doses of Terbutaline 0.25 mg.  Pt. 's BP had been noted to be 99/58 (previously 110-120/60's-93 and improved after receiving ephedrine by Anesthesia.  FHR returned to baseline with accelerations.  Cook Balloon removed by FER Coleman & VE by Dr. Patel 2/70/-3.  Pt. had also been repositioned & rec'd IV fluids.  Pt. was reassured & Dr. Yoon informed.    THIERNO Forbes
R3 Chart Note    32 year old POD #1 s/p  section for Cat 2 tracing c/b hemorrhage ( EBL 1624 cc) s/p 1 unit PRBC for symptomatic anemia ( lightheadedness) .    BP:90/50s    HR:80s  UOP:2600cc from 7am to 145pm. Adequate       -UO adequate, carrasco to be discontinued    -Repeat CBC in 4 hours    -Regular diet  -Continue to monitor symptoms   -Patient cleared for PP floor   -CBC @ 6PM     d/w Dr. Rudy Alarcon, PGY3

## 2023-01-21 NOTE — DISCHARGE NOTE OB - MEDICATION SUMMARY - MEDICATIONS TO TAKE
I will START or STAY ON the medications listed below when I get home from the hospital:    acetaminophen 325 mg oral tablet  -- 3 tab(s) by mouth every 4 hours  -- Indication: For c/s    ibuprofen 600 mg oral tablet  -- 1 tab(s) by mouth every 6 hours  -- Indication: For Supervision of other normal pregnancy, antepartum    ferrous sulfate 325 mg (65 mg elemental iron) oral tablet  -- 1 tab(s) by mouth once a day  -- Indication: For anemia

## 2023-01-21 NOTE — OB PROVIDER DELIVERY SUMMARY - NSPROVIDERDELIVERYNOTE_OBGYN_ALL_OB_FT
Male infant, 89 APGARS, 8lb 12 oz, normal amniotic fluid, vascular lower uterine segment  I =2500  VU=662  QBL 1624 cc  Drains Diaz  Counts correct

## 2023-01-21 NOTE — OB PROVIDER DELIVERY SUMMARY - NSSELHIDDEN_OBGYN_ALL_OB_FT
[NS_DeliveryAttending1_OBGYN_ALL_OB_FT:[id number]],[NS_DeliveryAssist1_OBGYN_ALL_OB_FT:[id number]=],[NS_DeliveryRN_OBGYN_ALL_OB_FT:[id number]]

## 2023-01-22 LAB
ALBUMIN SERPL ELPH-MCNC: 2.8 G/DL — LOW (ref 3.3–5)
ALP SERPL-CCNC: 85 U/L — SIGNIFICANT CHANGE UP (ref 40–120)
ALT FLD-CCNC: 10 U/L — SIGNIFICANT CHANGE UP (ref 10–45)
ANION GAP SERPL CALC-SCNC: 9 MMOL/L — SIGNIFICANT CHANGE UP (ref 5–17)
APTT BLD: 27.4 SEC — LOW (ref 27.5–35.5)
AST SERPL-CCNC: 22 U/L — SIGNIFICANT CHANGE UP (ref 10–40)
BASOPHILS # BLD AUTO: 0.04 K/UL — SIGNIFICANT CHANGE UP (ref 0–0.2)
BASOPHILS NFR BLD AUTO: 0.3 % — SIGNIFICANT CHANGE UP (ref 0–2)
BILIRUB SERPL-MCNC: 0.2 MG/DL — SIGNIFICANT CHANGE UP (ref 0.2–1.2)
BUN SERPL-MCNC: 7 MG/DL — SIGNIFICANT CHANGE UP (ref 7–23)
CALCIUM SERPL-MCNC: 9 MG/DL — SIGNIFICANT CHANGE UP (ref 8.4–10.5)
CHLORIDE SERPL-SCNC: 106 MMOL/L — SIGNIFICANT CHANGE UP (ref 96–108)
CO2 SERPL-SCNC: 24 MMOL/L — SIGNIFICANT CHANGE UP (ref 22–31)
CREAT SERPL-MCNC: 0.47 MG/DL — LOW (ref 0.5–1.3)
EGFR: 130 ML/MIN/1.73M2 — SIGNIFICANT CHANGE UP
EOSINOPHIL # BLD AUTO: 0.17 K/UL — SIGNIFICANT CHANGE UP (ref 0–0.5)
EOSINOPHIL NFR BLD AUTO: 1.2 % — SIGNIFICANT CHANGE UP (ref 0–6)
FIBRINOGEN PPP-MCNC: 671 MG/DL — HIGH (ref 200–445)
GLUCOSE SERPL-MCNC: 87 MG/DL — SIGNIFICANT CHANGE UP (ref 70–99)
HCT VFR BLD CALC: 21.9 % — LOW (ref 34.5–45)
HCT VFR BLD CALC: 22.9 % — LOW (ref 34.5–45)
HGB BLD-MCNC: 7.3 G/DL — LOW (ref 11.5–15.5)
HGB BLD-MCNC: 7.5 G/DL — LOW (ref 11.5–15.5)
IMM GRANULOCYTES NFR BLD AUTO: 0.8 % — SIGNIFICANT CHANGE UP (ref 0–0.9)
INR BLD: 0.97 RATIO — SIGNIFICANT CHANGE UP (ref 0.88–1.16)
LYMPHOCYTES # BLD AUTO: 1.91 K/UL — SIGNIFICANT CHANGE UP (ref 1–3.3)
LYMPHOCYTES # BLD AUTO: 13 % — SIGNIFICANT CHANGE UP (ref 13–44)
MCHC RBC-ENTMCNC: 32.6 PG — SIGNIFICANT CHANGE UP (ref 27–34)
MCHC RBC-ENTMCNC: 32.7 PG — SIGNIFICANT CHANGE UP (ref 27–34)
MCHC RBC-ENTMCNC: 32.8 GM/DL — SIGNIFICANT CHANGE UP (ref 32–36)
MCHC RBC-ENTMCNC: 33.3 GM/DL — SIGNIFICANT CHANGE UP (ref 32–36)
MCV RBC AUTO: 98.2 FL — SIGNIFICANT CHANGE UP (ref 80–100)
MCV RBC AUTO: 99.6 FL — SIGNIFICANT CHANGE UP (ref 80–100)
MONOCYTES # BLD AUTO: 0.73 K/UL — SIGNIFICANT CHANGE UP (ref 0–0.9)
MONOCYTES NFR BLD AUTO: 5 % — SIGNIFICANT CHANGE UP (ref 2–14)
NEUTROPHILS # BLD AUTO: 11.69 K/UL — HIGH (ref 1.8–7.4)
NEUTROPHILS NFR BLD AUTO: 79.7 % — HIGH (ref 43–77)
NRBC # BLD: 0 /100 WBCS — SIGNIFICANT CHANGE UP (ref 0–0)
NRBC # BLD: 0 /100 WBCS — SIGNIFICANT CHANGE UP (ref 0–0)
PLATELET # BLD AUTO: 171 K/UL — SIGNIFICANT CHANGE UP (ref 150–400)
PLATELET # BLD AUTO: 200 K/UL — SIGNIFICANT CHANGE UP (ref 150–400)
POTASSIUM SERPL-MCNC: 4.3 MMOL/L — SIGNIFICANT CHANGE UP (ref 3.5–5.3)
POTASSIUM SERPL-SCNC: 4.3 MMOL/L — SIGNIFICANT CHANGE UP (ref 3.5–5.3)
PROT SERPL-MCNC: 5.2 G/DL — LOW (ref 6–8.3)
PROTHROM AB SERPL-ACNC: 11.2 SEC — SIGNIFICANT CHANGE UP (ref 10.5–13.4)
RBC # BLD: 2.23 M/UL — LOW (ref 3.8–5.2)
RBC # BLD: 2.3 M/UL — LOW (ref 3.8–5.2)
RBC # FLD: 14.1 % — SIGNIFICANT CHANGE UP (ref 10.3–14.5)
RBC # FLD: 14.2 % — SIGNIFICANT CHANGE UP (ref 10.3–14.5)
SODIUM SERPL-SCNC: 139 MMOL/L — SIGNIFICANT CHANGE UP (ref 135–145)
WBC # BLD: 14.65 K/UL — HIGH (ref 3.8–10.5)
WBC # BLD: 14.92 K/UL — HIGH (ref 3.8–10.5)
WBC # FLD AUTO: 14.65 K/UL — HIGH (ref 3.8–10.5)
WBC # FLD AUTO: 14.92 K/UL — HIGH (ref 3.8–10.5)

## 2023-01-22 RX ORDER — FERROUS SULFATE 325(65) MG
325 TABLET ORAL DAILY
Refills: 0 | Status: DISCONTINUED | OUTPATIENT
Start: 2023-01-22 | End: 2023-01-23

## 2023-01-22 RX ORDER — ASCORBIC ACID 60 MG
500 TABLET,CHEWABLE ORAL DAILY
Refills: 0 | Status: DISCONTINUED | OUTPATIENT
Start: 2023-01-22 | End: 2023-01-23

## 2023-01-22 RX ORDER — IBUPROFEN 200 MG
600 TABLET ORAL EVERY 6 HOURS
Refills: 0 | Status: DISCONTINUED | OUTPATIENT
Start: 2023-01-22 | End: 2023-01-23

## 2023-01-22 RX ADMIN — Medication 600 MILLIGRAM(S): at 04:02

## 2023-01-22 RX ADMIN — Medication 600 MILLIGRAM(S): at 09:41

## 2023-01-22 RX ADMIN — Medication 600 MILLIGRAM(S): at 15:41

## 2023-01-22 RX ADMIN — Medication 600 MILLIGRAM(S): at 15:09

## 2023-01-22 RX ADMIN — Medication 500 MILLIGRAM(S): at 11:36

## 2023-01-22 RX ADMIN — Medication 975 MILLIGRAM(S): at 23:59

## 2023-01-22 RX ADMIN — Medication 975 MILLIGRAM(S): at 11:36

## 2023-01-22 RX ADMIN — HEPARIN SODIUM 5000 UNIT(S): 5000 INJECTION INTRAVENOUS; SUBCUTANEOUS at 17:31

## 2023-01-22 RX ADMIN — HEPARIN SODIUM 5000 UNIT(S): 5000 INJECTION INTRAVENOUS; SUBCUTANEOUS at 05:49

## 2023-01-22 RX ADMIN — Medication 600 MILLIGRAM(S): at 10:43

## 2023-01-22 RX ADMIN — Medication 975 MILLIGRAM(S): at 05:49

## 2023-01-22 RX ADMIN — Medication 600 MILLIGRAM(S): at 20:13

## 2023-01-22 RX ADMIN — Medication 975 MILLIGRAM(S): at 17:24

## 2023-01-22 RX ADMIN — Medication 975 MILLIGRAM(S): at 12:15

## 2023-01-22 RX ADMIN — Medication 600 MILLIGRAM(S): at 21:00

## 2023-01-22 RX ADMIN — Medication 975 MILLIGRAM(S): at 06:19

## 2023-01-22 RX ADMIN — Medication 325 MILLIGRAM(S): at 11:36

## 2023-01-22 RX ADMIN — Medication 975 MILLIGRAM(S): at 18:00

## 2023-01-22 RX ADMIN — Medication 600 MILLIGRAM(S): at 03:32

## 2023-01-22 NOTE — PROGRESS NOTE ADULT - ASSESSMENT
A/P: pLTCS c/b elevated QBL (1624cc) s/p 2U of pRBCs while in PACU/recovery. No change noted in CBC after 2nd unit, but pt w/ reassuring VS and overall asymptomatic.   - Continue regular diet.  - Encourage ambulation  - Continue motrin, tylenol, oxycodone PRN for pain control.    #Acute blood loss anemia  - Will order for Fe  - AM H/H 7.3/21.9 (7.8/23.7 prior)   - Will consider CBC later today versus tomorrow in AM    Andrew Osullivan, PGY-1  Ob/Gyn

## 2023-01-22 NOTE — PROGRESS NOTE ADULT - ATTENDING COMMENTS
Pt called back, gave results. Pt is not having diarrhea at this time, canceling orders. Pt states no GI symptoms at this time, will call back if symptoms come back.    POD2 s/p 1'  section, doing well  #POST OP WITH ACUTE BLOOD LOSS ANEMIA S/P 2 UNITS PRBCS, now without any symptoms, ambulating without difficulty.    -repeat CBC at noon  -Routine postoperative care   -Reg diet   -TAMELA Grant MD

## 2023-01-23 VITALS
HEART RATE: 73 BPM | RESPIRATION RATE: 18 BRPM | TEMPERATURE: 97 F | SYSTOLIC BLOOD PRESSURE: 114 MMHG | OXYGEN SATURATION: 96 % | DIASTOLIC BLOOD PRESSURE: 74 MMHG

## 2023-01-23 PROCEDURE — 86900 BLOOD TYPING SEROLOGIC ABO: CPT

## 2023-01-23 PROCEDURE — 86780 TREPONEMA PALLIDUM: CPT

## 2023-01-23 PROCEDURE — 36415 COLL VENOUS BLD VENIPUNCTURE: CPT

## 2023-01-23 PROCEDURE — 85384 FIBRINOGEN ACTIVITY: CPT

## 2023-01-23 PROCEDURE — 86769 SARS-COV-2 COVID-19 ANTIBODY: CPT

## 2023-01-23 PROCEDURE — 59050 FETAL MONITOR W/REPORT: CPT

## 2023-01-23 PROCEDURE — 85610 PROTHROMBIN TIME: CPT

## 2023-01-23 PROCEDURE — P9016: CPT

## 2023-01-23 PROCEDURE — C1889: CPT

## 2023-01-23 PROCEDURE — 88307 TISSUE EXAM BY PATHOLOGIST: CPT

## 2023-01-23 PROCEDURE — 85027 COMPLETE CBC AUTOMATED: CPT

## 2023-01-23 PROCEDURE — 86850 RBC ANTIBODY SCREEN: CPT

## 2023-01-23 PROCEDURE — 80053 COMPREHEN METABOLIC PANEL: CPT

## 2023-01-23 PROCEDURE — 86923 COMPATIBILITY TEST ELECTRIC: CPT

## 2023-01-23 PROCEDURE — 36430 TRANSFUSION BLD/BLD COMPNT: CPT

## 2023-01-23 PROCEDURE — 87635 SARS-COV-2 COVID-19 AMP PRB: CPT

## 2023-01-23 PROCEDURE — 85025 COMPLETE CBC W/AUTO DIFF WBC: CPT

## 2023-01-23 PROCEDURE — 59025 FETAL NON-STRESS TEST: CPT

## 2023-01-23 PROCEDURE — 86901 BLOOD TYPING SEROLOGIC RH(D): CPT

## 2023-01-23 PROCEDURE — 85730 THROMBOPLASTIN TIME PARTIAL: CPT

## 2023-01-23 RX ORDER — FERROUS SULFATE 325(65) MG
1 TABLET ORAL
Qty: 0 | Refills: 0 | DISCHARGE
Start: 2023-01-23

## 2023-01-23 RX ORDER — IBUPROFEN 200 MG
1 TABLET ORAL
Qty: 0 | Refills: 0 | DISCHARGE
Start: 2023-01-23

## 2023-01-23 RX ORDER — ACETAMINOPHEN 500 MG
3 TABLET ORAL
Qty: 0 | Refills: 0 | DISCHARGE
Start: 2023-01-23

## 2023-01-23 RX ADMIN — Medication 975 MILLIGRAM(S): at 11:55

## 2023-01-23 RX ADMIN — Medication 600 MILLIGRAM(S): at 04:12

## 2023-01-23 RX ADMIN — Medication 600 MILLIGRAM(S): at 10:00

## 2023-01-23 RX ADMIN — Medication 600 MILLIGRAM(S): at 03:26

## 2023-01-23 RX ADMIN — Medication 975 MILLIGRAM(S): at 00:35

## 2023-01-23 RX ADMIN — Medication 975 MILLIGRAM(S): at 05:33

## 2023-01-23 RX ADMIN — Medication 600 MILLIGRAM(S): at 09:29

## 2023-01-23 RX ADMIN — Medication 500 MILLIGRAM(S): at 11:55

## 2023-01-23 RX ADMIN — Medication 975 MILLIGRAM(S): at 12:30

## 2023-01-23 RX ADMIN — Medication 975 MILLIGRAM(S): at 06:10

## 2023-01-23 RX ADMIN — HEPARIN SODIUM 5000 UNIT(S): 5000 INJECTION INTRAVENOUS; SUBCUTANEOUS at 05:34

## 2023-01-23 RX ADMIN — Medication 325 MILLIGRAM(S): at 11:55

## 2023-01-23 NOTE — PROGRESS NOTE ADULT - ATTENDING COMMENTS
DOING WELL. FOR D/C TODAY  VSS  INCISION C/D/I  POD #3 STABLE  ANEMIA- 2 TO ACUTE BLOOD LOSS  STABLE  D/C HOME. INSTR GIVEN  POSTOP 2 WEEKS    KEVEN PATEL

## 2023-01-23 NOTE — PROGRESS NOTE ADULT - ASSESSMENT
A/P: POD#3 s/p pLTCS c/b elevated QBL (1624cc) s/p 2U of pRBCs while in PACU/recovery. No change noted in CBC after 2nd unit, but pt w/ reassuring VS and overall asymptomatic.    #Post-operative state  - Continue regular diet.  - Encourage ambulation  - Continue motrin, tylenol, oxycodone PRN for pain control.    #Acute blood loss anemia  - Continue with Fe/Vit C  - Hct stable s/p 2u pRBC, 23.7->31.9->22.9  - Pt asymptomatic    Sarah Wilburn PGY1

## 2023-01-23 NOTE — PROGRESS NOTE ADULT - SUBJECTIVE AND OBJECTIVE BOX
Day 1 of Anesthesia Pain Management Service    SUBJECTIVE:  Pain Scale Score:          [X] Refer to charted pain scores    THERAPY:    s/p 2mg PF morphine on 1/20      MEDICATIONS  (STANDING):  acetaminophen     Tablet .. 975 milliGRAM(s) Oral <User Schedule>  chlorhexidine 2% Cloths 1 Application(s) Topical once  citric acid/sodium citrate Solution 15 milliLiter(s) Oral every 6 hours  dextrose 5% + lactated ringers. 1000 milliLiter(s) (125 mL/Hr) IV Continuous <Continuous>  diphtheria/tetanus/pertussis (acellular) Vaccine (Adacel) 0.5 milliLiter(s) IntraMuscular once  heparin   Injectable 5000 Unit(s) SubCutaneous every 12 hours  ibuprofen  Tablet. 600 milliGRAM(s) Oral every 6 hours  ketorolac   Injectable 30 milliGRAM(s) IV Push every 6 hours  lactated ringers Bolus 1000 milliLiter(s) IV Bolus once  lactated ringers. 1000 milliLiter(s) (125 mL/Hr) IV Continuous <Continuous>  lactated ringers. 1000 milliLiter(s) (125 mL/Hr) IV Continuous <Continuous>  morphine PF Epidural 2 milliGRAM(s) Epidural once  oxytocin Infusion 333.333 milliUNIT(s)/Min (1000 mL/Hr) IV Continuous <Continuous>  oxytocin Infusion 333.333 milliUNIT(s)/Min (1000 mL/Hr) IV Continuous <Continuous>    MEDICATIONS  (PRN):  dexAMETHasone  Injectable 4 milliGRAM(s) IV Push every 6 hours PRN Nausea  diphenhydrAMINE 25 milliGRAM(s) Oral every 6 hours PRN Pruritus  lanolin Ointment 1 Application(s) Topical every 6 hours PRN Sore Nipples  magnesium hydroxide Suspension 30 milliLiter(s) Oral two times a day PRN Constipation  nalbuphine Injectable 2.5 milliGRAM(s) IV Push every 6 hours PRN Pruritus  naloxone Injectable 0.1 milliGRAM(s) IV Push every 3 minutes PRN For ANY of the following changes in patient status:  A. Breaths Per Minute LESS THAN 10, B. Oxygen saturation LESS THAN 90%, C. Sedation score of 6 for Stop After: 4 Times  ondansetron Injectable 4 milliGRAM(s) IV Push every 6 hours PRN Nausea  oxyCODONE    IR 5 milliGRAM(s) Oral every 3 hours PRN Mild Pain (1 - 3)  oxyCODONE    IR 5 milliGRAM(s) Oral every 3 hours PRN Moderate to Severe Pain (4-10)  oxyCODONE    IR 5 milliGRAM(s) Oral once PRN Moderate to Severe Pain (4-10)  simethicone 80 milliGRAM(s) Chew every 4 hours PRN Gas      OBJECTIVE:    Sedation:        	[X] Alert	[ ] Drowsy	[ ] Arousable      [ ] Asleep       [ ] Unresponsive    Side Effects:	[X] None	[ ] Nausea	[ ] Vomiting         [ ] Pruritus  		[ ] Weakness            [ ] Numbness	          [ ] Other:    Vital Signs Last 24 Hrs  T(C): 36.7 (21 Jan 2023 10:52), Max: 37.0 (20 Jan 2023 17:58)  T(F): 98.1 (21 Jan 2023 10:52), Max: 98.6 (20 Jan 2023 17:58)  HR: 86 (21 Jan 2023 13:00) (65 - 160)  BP: 94/52 (21 Jan 2023 13:00) (70/32 - 148/123)  BP(mean): 72 (21 Jan 2023 13:00) (46 - 85)  RR: 13 (21 Jan 2023 13:00) (11 - 57)  SpO2: 96% (21 Jan 2023 13:00) (66% - 100%)    Parameters below as of 21 Jan 2023 13:00  Patient On (Oxygen Delivery Method): room air        ASSESSMENT/ PLAN  [X] Patient transitioned to prn analgesics  [X] Pain management per primary service, pain service to sign off   [X]Documentation and Verification of current medications
Postpartum Note,  Section   ATTENDING NOTE - Post-operative day 1    Subjective:  The patient feels well except she feels a little"lightheaded"  No HA, [place]/RUQ or epigastric pain  Pt is tolerating regular diet. Pain well controlled.     ( x  ) Carrasco still in place     Physical exam:    Vital Signs Last 24 Hrs  T(C): 37 (2023 01:00), Max: 37.0 (2023 10:09)  T(F): 98.6 (2023 01:00), Max: 98.6 (2023 10:09)  HR: 76 (2023 04:30) (65 - 160)  BP: 99/49 (2023 04:30) (70/32 - 148/123)  BP(mean): 70 (2023 04:30) (46 - 83)  RR: 12 (2023 04:30) (12 - 57)  SpO2: 93% (2023 04:30) (66% - 100%)    Parameters below as of 2023 00:00  Patient On (Oxygen Delivery Method): room air        Gen: NAD  Breast: Soft, nontender, not engorged.  Abdomen: Soft, nontender, no distension , firm uterine fundus at umbilicus -2.  Incision: Clean, dry, and intact with dermabond/prineo  Ext: No calf tenderness, SCD in place      LABS:                        6.6    14.71 )-----------( 190      ( 2023 04:08 )             19.8                         8.0    16.92 )-----------( 268      ( 2023 23:43 )             24.6                         11.8   12.43 )-----------( 280      ( 2023 11:57 )             35.1     ABO Interpretation: O ( @ 04:48)  Rh Interpretation: Positive ( @ 04:48)  Antibody Screen: Negative ( @ 12:34)  ABO Interpretation: O ( @ 12:34)  Rh Interpretation: Positive ( @ 12:34)  ABO Interpretation: O ( @ 12:34)  Rh Interpretation: Positive ( @ 12:34)    23 @ 23:43      136  |  104  |  6<L>  ----------------------------<  92  4.6   |  22  |  0.48<L>        Ca    8.5      2023 23:43    TPro  4.5<L>  /  Alb  2.7<L>  /  TBili  0.2  /  DBili  x   /  AST  15  /  ALT  7<L>  /  AlkPhos  93  23 @ 23:43        Allergies    penicillin (Rash)    Intolerances      MEDICATIONS  (STANDING):  acetaminophen     Tablet .. 975 milliGRAM(s) Oral <User Schedule>  ceFAZolin   IVPB 2000 milliGRAM(s) IV Intermittent once  chlorhexidine 2% Cloths 1 Application(s) Topical once  citric acid/sodium citrate Solution 15 milliLiter(s) Oral every 6 hours  dextrose 5% + lactated ringers. 1000 milliLiter(s) (125 mL/Hr) IV Continuous <Continuous>  diphtheria/tetanus/pertussis (acellular) Vaccine (Adacel) 0.5 milliLiter(s) IntraMuscular once  heparin   Injectable 5000 Unit(s) SubCutaneous every 12 hours  ibuprofen  Tablet. 600 milliGRAM(s) Oral every 6 hours  ketorolac   Injectable 30 milliGRAM(s) IV Push every 6 hours  lactated ringers Bolus 1000 milliLiter(s) IV Bolus once  lactated ringers. 1000 milliLiter(s) (125 mL/Hr) IV Continuous <Continuous>  lactated ringers. 1000 milliLiter(s) (125 mL/Hr) IV Continuous <Continuous>  morphine PF Epidural 2 milliGRAM(s) Epidural once  oxytocin Infusion 333.333 milliUNIT(s)/Min (1000 mL/Hr) IV Continuous <Continuous>  oxytocin Infusion 333.333 milliUNIT(s)/Min (1000 mL/Hr) IV Continuous <Continuous>    MEDICATIONS  (PRN):  dexAMETHasone  Injectable 4 milliGRAM(s) IV Push every 6 hours PRN Nausea  diphenhydrAMINE 25 milliGRAM(s) Oral every 6 hours PRN Pruritus  lanolin Ointment 1 Application(s) Topical every 6 hours PRN Sore Nipples  magnesium hydroxide Suspension 30 milliLiter(s) Oral two times a day PRN Constipation  nalbuphine Injectable 2.5 milliGRAM(s) IV Push every 6 hours PRN Pruritus  naloxone Injectable 0.1 milliGRAM(s) IV Push every 3 minutes PRN For ANY of the following changes in patient status:  A. Breaths Per Minute LESS THAN 10, B. Oxygen saturation LESS THAN 90%, C. Sedation score of 6 for Stop After: 4 Times  ondansetron Injectable 4 milliGRAM(s) IV Push every 6 hours PRN Nausea  oxyCODONE    IR 5 milliGRAM(s) Oral every 3 hours PRN Mild Pain (1 - 3)  oxyCODONE    IR 5 milliGRAM(s) Oral every 3 hours PRN Moderate to Severe Pain (4-10)  oxyCODONE    IR 5 milliGRAM(s) Oral once PRN Moderate to Severe Pain (4-10)  simethicone 80 milliGRAM(s) Chew every 4 hours PRN Gas        Assessment and Plan  POD # 1  s/p  section with hemorrhage EBL 1624 cc  Will transfuse 1 unit PRBC and repeat labs in 4 hours  UO is adequate-maintain carrasco until she is stable to transfer to the pp floor.  Pt is aware she will receive 1 unit for now. She is lightheaded   Monitor vitals  regular diet  Office [phone]  Dr. Mace                  
OB Progress Note:  Delivery, POD#1    S: 32y POD#1 s/p pLTCS c/b elevated QBL (1624cc) s/p 2U of pRBCs while in PACU/recovery. Pain controlled. Tolerating a regular diet. Not yet passing flatus. Denies n/v, chest pain, shortness of breath, or lightheadedness/dizziness. Voiding spontaneously. Ambulating. Denies any light-headedness/dizziness w/ ambulating. Reported improvement in fatigue after 1st unit, but no marked change with the 2nd unit    O:   Vital Signs Last 24 Hrs  T(C): 36.9 (2023 05:25), Max: 37.1 (2023 17:30)  T(F): 98.5 (2023 05:25), Max: 98.8 (2023 17:30)  HR: 87 (2023 05:25) (72 - 102)  BP: 102/64 (2023 05:25) (94/51 - 118/58)  BP(mean): 76 (2023 14:00) (68 - 85)  RR: 18 (2023 05:25) (11 - 32)  SpO2: 96% (2023 05:25) (93% - 98%)    Parameters below as of 2023 17:30  Patient On (Oxygen Delivery Method): room air        Labs:  Blood type: O Positive  Rubella IgG: RPR: Negative                          7.3<L>   14.92<H> >-----------< 171    (  @ 06:15 )             21.9<L>                        7.8<L>   15.67<H> >-----------< 173    (  @ 19:14 )             23.7<L>                        7.8<L>   14.78<H> >-----------< 171    (  @ 09:33 )             23.8<L>                        6.6<LL>   14.71<H> >-----------< 190    (  @ 04:08 )             19.8<LL>                        8.0<L>   16.92<H> >-----------< 268    (  @ 23:43 )             24.6<L>                        11.8   12.43<H> >-----------< 280    (  @ 11:57 )             35.1    23 @ 23:43      136  |  104  |  6<L>  ----------------------------<  92  4.6   |  22  |  0.48<L>        Ca    8.5      2023 23:43    TPro  4.5<L>  /  Alb  2.7<L>  /  TBili  0.2  /  DBili  x   /  AST  15  /  ALT  7<L>  /  AlkPhos  93  23 @ 23:43          PE:  General: No acute distress. Sitting up in bed comfortably prior to eval   Pulm: Unlabored breathing. No respiratory distress  Abdomen: Soft. Non-tender. Incision c/d/i   Extremities: No calf tenderness bilaterally     
R1 Progress Note    Patient seen and examined at bedside, no acute overnight events. No acute complaints, pain well controlled. Patient is ambulating and tolerating regular diet. Voiding spontaneously and passing flatus. Denies CP, SOB, N/V, HA, blurred vision, epigastric pain.    Vital Signs Last 24 Hours  T(C): 37 (01-23-23 @ 01:04), Max: 37.1 (01-22-23 @ 16:29)  HR: 75 (01-23-23 @ 01:04) (75 - 92)  BP: 109/70 (01-23-23 @ 01:04) (102/64 - 109/71)  RR: 18 (01-23-23 @ 01:04) (18 - 18)  SpO2: 97% (01-23-23 @ 01:04) (96% - 99%)    I&O's Summary    21 Jan 2023 07:01  -  22 Jan 2023 07:00  --------------------------------------------------------  IN: 1300 mL / OUT: 4700 mL / NET: -3400 mL        Physical Exam:  General: NAD  Abdomen: Soft, non-tender, non-distended, fundus firm  Incision: Pfannenstiel incision CDI, subcuticular suture closure  Pelvic: Lochia wnl    Labs:    Blood Type: O Positive  Antibody Screen: --  RPR: Negative               7.5    14.65 )-----------( 200      ( 01-22 @ 12:17 )             22.9                7.3    14.92 )-----------( 171      ( 01-22 @ 06:15 )             21.9                7.8    15.67 )-----------( 173      ( 01-21 @ 19:14 )             23.7         MEDICATIONS  (STANDING):  acetaminophen     Tablet .. 975 milliGRAM(s) Oral <User Schedule>  ascorbic acid 500 milliGRAM(s) Oral daily  diphtheria/tetanus/pertussis (acellular) Vaccine (Adacel) 0.5 milliLiter(s) IntraMuscular once  ferrous    sulfate 325 milliGRAM(s) Oral daily  heparin   Injectable 5000 Unit(s) SubCutaneous every 12 hours  ibuprofen  Tablet. 600 milliGRAM(s) Oral every 6 hours  lactated ringers Bolus 1000 milliLiter(s) IV Bolus once  lactated ringers. 1000 milliLiter(s) (125 mL/Hr) IV Continuous <Continuous>  oxytocin Infusion 333.333 milliUNIT(s)/Min (1000 mL/Hr) IV Continuous <Continuous>  oxytocin Infusion 333.333 milliUNIT(s)/Min (1000 mL/Hr) IV Continuous <Continuous>    MEDICATIONS  (PRN):  dexAMETHasone  Injectable 4 milliGRAM(s) IV Push every 6 hours PRN Nausea  diphenhydrAMINE 25 milliGRAM(s) Oral every 6 hours PRN Pruritus  lanolin Ointment 1 Application(s) Topical every 6 hours PRN Sore Nipples  magnesium hydroxide Suspension 30 milliLiter(s) Oral two times a day PRN Constipation  nalbuphine Injectable 2.5 milliGRAM(s) IV Push every 6 hours PRN Pruritus  naloxone Injectable 0.1 milliGRAM(s) IV Push every 3 minutes PRN For ANY of the following changes in patient status:  A. Breaths Per Minute LESS THAN 10, B. Oxygen saturation LESS THAN 90%, C. Sedation score of 6 for Stop After: 4 Times  ondansetron Injectable 4 milliGRAM(s) IV Push every 6 hours PRN Nausea  oxyCODONE    IR 5 milliGRAM(s) Oral every 3 hours PRN Moderate to Severe Pain (4-10)  oxyCODONE    IR 5 milliGRAM(s) Oral once PRN Moderate to Severe Pain (4-10)  simethicone 80 milliGRAM(s) Chew every 4 hours PRN Gas

## 2023-01-31 LAB — SURGICAL PATHOLOGY STUDY: SIGNIFICANT CHANGE UP

## 2023-08-01 ENCOUNTER — NON-APPOINTMENT (OUTPATIENT)
Age: 33
End: 2023-08-01

## 2023-08-01 ENCOUNTER — APPOINTMENT (OUTPATIENT)
Dept: ORTHOPEDIC SURGERY | Facility: CLINIC | Age: 33
End: 2023-08-01
Payer: COMMERCIAL

## 2023-08-01 VITALS — WEIGHT: 145 LBS | BODY MASS INDEX: 23.3 KG/M2 | HEIGHT: 66 IN

## 2023-08-01 DIAGNOSIS — S93.432A SPRAIN OF TIBIOFIBULAR LIGAMENT OF LEFT ANKLE, INITIAL ENCOUNTER: ICD-10-CM

## 2023-08-01 PROCEDURE — 99203 OFFICE O/P NEW LOW 30 MIN: CPT

## 2023-08-01 NOTE — HISTORY OF PRESENT ILLNESS
[de-identified] : 32-year-old female presents with left ankle pain and swelling following injury on 7/30.  She twisted her ankle walking down a step 2 days ago and may have felt a small crack at the time.  She developed swelling and difficulty ambulating, and was seen at an urgent care where x-rays of the ankle were performed.  She has been applying ice and elevating, and taking Tylenol as needed.  She has noticed swelling across the top of her foot and ankle but she has been able to bear weight.

## 2023-08-01 NOTE — PHYSICAL EXAM
[de-identified] : General: No acute distress Mental: Alert and oriented x3 Eyes: Conjunctivitis not seen Chest: Symmetric chest rise, no audible wheezing Skin: Bilateral lower extremities absent from rashes and ulcers Abdomen: No distention  Left ankle: Skin: Clean, dry, intact  Inspection: No obvious malalignment, moderate swelling, mild ecchymosis laterally Pulses: 2+ DP/PT pulses  ROM: Full dorsiflexion and plantarflexion, pain with inversion, no pain with eversion Tenderness: No tenderness over the lateral malleolus, positive CFL/ATFL/PTFL pain. No medial malleolus pain, no deltoid ligament pain. No proximal fibular pain. No heel pain.  Stability: Positive anterior drawer, negative posterior drawer Strength: 5/5 TA/GS/EHL Neuro: Intact to light touch throughout  Additional tests: Negative Mortons test, Negative syndesmosis squeeze test [de-identified] : X-rays of the left ankle including 3 views at urgent care on 7/30 available in [facility]. There is neutral alignment, well-maintained joint spaces, mortise is maintained.  A small ossific densities at the dorsal soft tissues.  No acute fractures.

## 2023-08-01 NOTE — DISCUSSION/SUMMARY
[de-identified] : 32-year-old female with acute left ankle pain and swelling consistent with moderate ankle sprain at the talofibular ligaments.  Discussed the nature of this condition.  I recommended a 2-week period of relative rest, ice, elevation, compression, and Tylenol or anti-inflammatories as needed, WBAT left lower extremity.  If the pain is persistent beyond 3 weeks with an MRI.  Follow-up as needed.

## 2024-01-31 NOTE — DISCHARGE NOTE OB - FALL HARM RISK - PT AGE POPULATION HIDDEN
Adult Patient will improve sit <-> stand with supervision 3-5 sessions in order for patient to safely get in and out of chair

## 2024-04-15 NOTE — DISCHARGE NOTE OB - NS MD DC FALL RISK RISK
For information on Fall & Injury Prevention, visit: [email or web address] OR  [email or web address] OR  https://www.cdc.gov/steadi/patient.html Patient resting on gurney with eyes closed. Equal chest rise and fall noted. No labored respirations  No identifiable needs at this time  Connected to monitoring

## 2024-06-17 ENCOUNTER — APPOINTMENT (OUTPATIENT)
Dept: ANTEPARTUM | Facility: CLINIC | Age: 34
End: 2024-06-17
Payer: COMMERCIAL

## 2024-06-17 ENCOUNTER — ASOB RESULT (OUTPATIENT)
Age: 34
End: 2024-06-17

## 2024-06-17 PROCEDURE — 76811 OB US DETAILED SNGL FETUS: CPT

## 2024-09-21 NOTE — DISCHARGE NOTE OB - INSTRUCTIONS
Oriented - self; Oriented - place; Oriented - time Follow up with discharge apt. as directed by doctor.
